# Patient Record
(demographics unavailable — no encounter records)

---

## 2024-10-14 NOTE — HISTORY OF PRESENT ILLNESS
[FreeTextEntry1] : This is a 30-year-old male with no past medical history who presents for swelling pain and discomfort starting in 2018 above his anus at the apex of his midline gluteal fold.  The patient describes increased swelling and discomfort when he traumatized the area during snowboarding.  He does note intermittently there will be some drainage which is consistent with pus.

## 2024-10-14 NOTE — ASSESSMENT
[FreeTextEntry1] : 30-year-old male with nonactive pilonidal cyst and pilonidal sinus.  I counseled the patient about the prolonged healing process of a pilonidal cystectomy on the order of weeks to months.  The patient is still interested in definitively treating his pilonidal cyst with surgery.  Plan:  Schedule pilonidal cystectomy.

## 2024-10-14 NOTE — PHYSICAL EXAM
[Abdomen Masses] : No abdominal masses [Abdomen Tenderness] : ~T No ~M abdominal tenderness [Exam Deferred] : exam was deferred [None] : no anal fissures seen [Excoriation] : no perianal excoriation [Multiple Sinus Tracts] : no perianal sinus tracts [Fistula] : no fistulas [Wart] : no warts [Ulcer ___ cm] : no ulcers [Pilonidal Cyst] : a pilonidal cyst [Pilonidal Sinus] : a pilonidal sinus [Pilonidal Sinus Draining] : no pilonidal sinus drainage [JVD] : no jugular venous distention  [Normal Breath Sounds] : Normal breath sounds [Wheezing] : no wheezing was heard [Normal Heart Sounds] : normal heart sounds [Normal Rate and Rhythm] : normal rate and rhythm [No Rash or Lesion] : No rash or lesion [Purpura] : no purpura  [Petechiae] : no petechiae [Skin Ulcer] : no ulcer [Skin Induration] : no induration [Alert] : alert [Oriented to Person] : oriented to person [Oriented to Place] : oriented to place [Oriented to Time] : oriented to time [Calm] : calm [de-identified] : benign [de-identified] : mild swelling to left of center.  small pilonidal sinus [de-identified] : NAD [de-identified] : ALBINO [de-identified] : FROM

## 2024-11-27 NOTE — HISTORY OF PRESENT ILLNESS
[FreeTextEntry1] : 30-year-old male status post pilonidal cystectomy recovering well.  He complains of pain and limited mobility.

## 2024-11-27 NOTE — ASSESSMENT
[FreeTextEntry1] : 30-year-old male status post pilonidal cystectomy recovering appropriately.  Plan:  Tylenol and ibuprofen for pain, continue current wound regimen.  Follow-up with me in 3 to 4 weeks.

## 2024-11-27 NOTE — PHYSICAL EXAM
[de-identified] : benign [de-identified] : Pilonidal cyst excision site clean with healthy granulation tissue all the way to the base.

## 2024-12-27 NOTE — PHYSICAL EXAM
[Normal Breath Sounds] : Normal breath sounds [Wheezing] : no wheezing was heard [Normal Heart Sounds] : normal heart sounds [Normal Rate and Rhythm] : normal rate and rhythm [Alert] : alert [Oriented to Person] : oriented to person [Oriented to Place] : oriented to place [Oriented to Time] : oriented to time [Calm] : calm [de-identified] : soft, NT/ND [de-identified] : Gluteal cleft wound clean with granulation tissue treated with silver nitrate sticks.  Posterior midline edematous hemorrhoid with small cavity with purulent drainage.  No significant surrounding cellulitis [de-identified] : NAD [de-identified] : NCAT [de-identified] : supple [de-identified] : SOPHIA [de-identified] : warm

## 2024-12-27 NOTE — ASSESSMENT
[FreeTextEntry1] : Augmentin for abscess pilonidal healing well f/u with Dr. Lorenzo next week if not improved to discuss eua for possible fistula

## 2024-12-27 NOTE — HISTORY OF PRESENT ILLNESS
[FreeTextEntry1] : The patient reports that he was healing well since his last visit until about 2 weeks ago when he noticed some pain and swelling at the anal verge.  He has never experienced the symptoms before.  He reports that he began to feel somewhat better after about a day or 2 but he continues to have pain and swelling at the site

## 2025-01-06 NOTE — ASSESSMENT
[FreeTextEntry1] : Perianal abscess/fistula postop pilonidal cyst.  Plan:  Schedule exam under anesthesia fistulotomy possible seton.

## 2025-01-06 NOTE — HISTORY OF PRESENT ILLNESS
[FreeTextEntry1] : 30-year-old male with recent pilonidal cystectomy complaining of anal pain separate from surgical site.

## 2025-01-06 NOTE — PHYSICAL EXAM
[None] : no anal fissures seen [Excoriation] : no perianal excoriation [Multiple Sinus Tracts] : no perianal sinus tracts [Fistula] : a fistula [Wart] : no warts [Ulcer ___ cm] : no ulcers [Pilonidal Cyst] : no pilonidal cysts [Pilonidal Sinus] : no pilonidal sinus [Tender, Swollen] : nontender, non-swollen [de-identified] : Pilonidal cystectomy site healing well, posterior midline purulent drainage from perianal abscess/fistula.

## 2025-02-12 NOTE — HISTORY OF PRESENT ILLNESS
[FreeTextEntry1] : 30-year-old male with history of pilonidal cystectomy presents for a postoperative visit now after undergoing posterior midline fistulotomy.  He has no complaints.  He has good continence of both stool and gas.